# Patient Record
Sex: FEMALE
[De-identification: names, ages, dates, MRNs, and addresses within clinical notes are randomized per-mention and may not be internally consistent; named-entity substitution may affect disease eponyms.]

---

## 2018-04-09 ENCOUNTER — HOSPITAL ENCOUNTER (OUTPATIENT)
Dept: HOSPITAL 90 - RAH | Age: 68
Discharge: HOME | End: 2018-04-09
Attending: FAMILY MEDICINE
Payer: COMMERCIAL

## 2018-04-09 DIAGNOSIS — M25.462: Primary | ICD-10-CM

## 2018-04-09 PROCEDURE — 73721 MRI JNT OF LWR EXTRE W/O DYE: CPT

## 2019-06-26 ENCOUNTER — HOSPITAL ENCOUNTER (OUTPATIENT)
Dept: HOSPITAL 95 - LAB EV | Age: 69
Discharge: HOME | End: 2019-06-26
Attending: PHYSICIAN ASSISTANT
Payer: MEDICARE

## 2019-06-26 DIAGNOSIS — E11.37X1: Primary | ICD-10-CM

## 2019-06-26 LAB
ALBUMIN SERPL BCP-MCNC: 3.7 G/DL (ref 3.4–5)
ALBUMIN/GLOB SERPL: 0.9 {RATIO} (ref 0.8–1.8)
ALT SERPL W P-5'-P-CCNC: 29 U/L (ref 12–78)
ANION GAP SERPL CALCULATED.4IONS-SCNC: 10 MMOL/L (ref 6–16)
AST SERPL W P-5'-P-CCNC: 16 U/L (ref 12–37)
BASOPHILS # BLD AUTO: 0.07 K/MM3 (ref 0–0.23)
BASOPHILS NFR BLD AUTO: 1 % (ref 0–2)
BILIRUB SERPL-MCNC: 0.2 MG/DL (ref 0.1–1)
BUN SERPL-MCNC: 29 MG/DL (ref 8–24)
CALCIUM SERPL-MCNC: 9.9 MG/DL (ref 8.5–10.1)
CHLORIDE SERPL-SCNC: 101 MMOL/L (ref 98–108)
CO2 SERPL-SCNC: 27 MMOL/L (ref 21–32)
CREAT SERPL-MCNC: 0.96 MG/DL (ref 0.4–1)
DEPRECATED RDW RBC AUTO: 40.9 FL (ref 35.1–46.3)
EOSINOPHIL # BLD AUTO: 0.15 K/MM3 (ref 0–0.68)
EOSINOPHIL NFR BLD AUTO: 2 % (ref 0–6)
ERYTHROCYTE [DISTWIDTH] IN BLOOD BY AUTOMATED COUNT: 13.1 % (ref 11.7–14.2)
GLOBULIN SER CALC-MCNC: 3.9 G/DL (ref 2.2–4)
GLUCOSE SERPL-MCNC: 195 MG/DL (ref 70–99)
HCT VFR BLD AUTO: 37 % (ref 33–51)
HGB BLD-MCNC: 12.8 G/DL (ref 11.5–16)
IMM GRANULOCYTES # BLD AUTO: 0.02 K/MM3 (ref 0–0.1)
IMM GRANULOCYTES NFR BLD AUTO: 0 % (ref 0–1)
LYMPHOCYTES # BLD AUTO: 3.49 K/MM3 (ref 0.84–5.2)
LYMPHOCYTES NFR BLD AUTO: 35 % (ref 21–46)
MCHC RBC AUTO-ENTMCNC: 34.6 G/DL (ref 31.5–36.5)
MCV RBC AUTO: 87 FL (ref 80–100)
MONOCYTES # BLD AUTO: 0.58 K/MM3 (ref 0.16–1.47)
MONOCYTES NFR BLD AUTO: 6 % (ref 4–13)
NEUTROPHILS # BLD AUTO: 5.61 K/MM3 (ref 1.96–9.15)
NEUTROPHILS NFR BLD AUTO: 57 % (ref 41–73)
NRBC # BLD AUTO: 0 K/MM3 (ref 0–0.02)
NRBC BLD AUTO-RTO: 0 /100 WBC (ref 0–0.2)
PLATELET # BLD AUTO: 252 K/MM3 (ref 150–400)
POTASSIUM SERPL-SCNC: 4.2 MMOL/L (ref 3.5–5.5)
PROT SERPL-MCNC: 7.6 G/DL (ref 6.4–8.2)
SODIUM SERPL-SCNC: 138 MMOL/L (ref 136–145)

## 2022-04-22 ENCOUNTER — HOSPITAL ENCOUNTER (OUTPATIENT)
Dept: HOSPITAL 95 - LAB | Age: 72
Discharge: HOME | End: 2022-04-22
Attending: PHYSICIAN ASSISTANT
Payer: MEDICARE

## 2022-04-22 DIAGNOSIS — N39.0: Primary | ICD-10-CM

## 2022-05-08 ENCOUNTER — HOSPITAL ENCOUNTER (INPATIENT)
Dept: HOSPITAL 95 - ER | Age: 72
LOS: 2 days | Discharge: HOME | DRG: 690 | End: 2022-05-10
Attending: HOSPITALIST | Admitting: HOSPITALIST
Payer: MEDICARE

## 2022-05-08 VITALS — BODY MASS INDEX: 34.36 KG/M2 | HEIGHT: 60 IN | WEIGHT: 175 LBS

## 2022-05-08 DIAGNOSIS — E11.65: ICD-10-CM

## 2022-05-08 DIAGNOSIS — I10: ICD-10-CM

## 2022-05-08 DIAGNOSIS — E86.9: ICD-10-CM

## 2022-05-08 DIAGNOSIS — E87.1: ICD-10-CM

## 2022-05-08 DIAGNOSIS — B96.89: ICD-10-CM

## 2022-05-08 DIAGNOSIS — N39.0: Primary | ICD-10-CM

## 2022-05-08 DIAGNOSIS — E86.1: ICD-10-CM

## 2022-05-08 DIAGNOSIS — R11.2: ICD-10-CM

## 2022-05-08 DIAGNOSIS — E86.0: ICD-10-CM

## 2022-05-08 LAB
ALBUMIN SERPL BCP-MCNC: 3.6 G/DL (ref 3.4–5)
ALBUMIN/GLOB SERPL: 1.2 {RATIO} (ref 0.8–1.8)
ALT SERPL W P-5'-P-CCNC: 23 U/L (ref 12–78)
ANION GAP SERPL CALCULATED.4IONS-SCNC: 7 MMOL/L (ref 6–16)
AST SERPL W P-5'-P-CCNC: 17 U/L (ref 12–37)
BASOPHILS # BLD AUTO: 0.06 K/MM3 (ref 0–0.23)
BASOPHILS NFR BLD AUTO: 1 % (ref 0–2)
BILIRUB SERPL-MCNC: 0.5 MG/DL (ref 0.1–1)
BUN SERPL-MCNC: 18 MG/DL (ref 8–24)
CALCIUM SERPL-MCNC: 8.8 MG/DL (ref 8.5–10.1)
CHLORIDE SERPL-SCNC: 85 MMOL/L (ref 98–108)
CO2 SERPL-SCNC: 27 MMOL/L (ref 21–32)
CREAT SERPL-MCNC: 0.88 MG/DL (ref 0.4–1)
DEPRECATED RDW RBC AUTO: 37.8 FL (ref 35.1–46.3)
EOSINOPHIL # BLD AUTO: 0.03 K/MM3 (ref 0–0.68)
EOSINOPHIL NFR BLD AUTO: 1 % (ref 0–6)
ERYTHROCYTE [DISTWIDTH] IN BLOOD BY AUTOMATED COUNT: 12.4 % (ref 11.7–14.2)
FLUAV RNA SPEC QL NAA+PROBE: NEGATIVE
FLUBV RNA SPEC QL NAA+PROBE: NEGATIVE
GLOBULIN SER CALC-MCNC: 3.1 G/DL (ref 2.2–4)
GLUCOSE SERPL-MCNC: 196 MG/DL (ref 70–99)
HCT VFR BLD AUTO: 34.2 % (ref 33–51)
HGB BLD-MCNC: 12.2 G/DL (ref 11.5–16)
IMM GRANULOCYTES # BLD AUTO: 0.01 K/MM3 (ref 0–0.1)
IMM GRANULOCYTES NFR BLD AUTO: 0 % (ref 0–1)
LEUKOCYTE ESTERASE UR QL STRIP: (no result)
LYMPHOCYTES # BLD AUTO: 1.5 K/MM3 (ref 0.84–5.2)
LYMPHOCYTES NFR BLD AUTO: 24 % (ref 21–46)
MCHC RBC AUTO-ENTMCNC: 35.7 G/DL (ref 31.5–36.5)
MCV RBC AUTO: 83 FL (ref 80–100)
MONOCYTES # BLD AUTO: 0.48 K/MM3 (ref 0.16–1.47)
MONOCYTES NFR BLD AUTO: 8 % (ref 4–13)
NEUTROPHILS # BLD AUTO: 4.27 K/MM3 (ref 1.96–9.15)
NEUTROPHILS NFR BLD AUTO: 67 % (ref 41–73)
NRBC # BLD AUTO: 0 K/MM3 (ref 0–0.02)
NRBC BLD AUTO-RTO: 0 /100 WBC (ref 0–0.2)
PLATELET # BLD AUTO: 291 K/MM3 (ref 150–400)
POTASSIUM SERPL-SCNC: 4.3 MMOL/L (ref 3.5–5.5)
PROT SERPL-MCNC: 6.7 G/DL (ref 6.4–8.2)
RBC #/AREA URNS HPF: (no result) /HPF (ref 0–2)
RSV RNA SPEC QL NAA+PROBE: NEGATIVE
SARS-COV-2 RNA RESP QL NAA+PROBE: NEGATIVE
SODIUM SERPL-SCNC: 119 MMOL/L (ref 136–145)
SP GR SPEC: 1.01 (ref 1–1.02)
UROBILINOGEN UR STRIP-MCNC: (no result) MG/DL
WBC #/AREA URNS HPF: (no result) /HPF (ref 0–5)

## 2022-05-08 PROCEDURE — A9270 NON-COVERED ITEM OR SERVICE: HCPCS

## 2022-05-08 NOTE — NUR
SHIFT SUMMARY
PT TO ROOM AT 1241. PT ABLE TO AMBULATE W/ CANE & STANDBY ASSIST. PT IS UNABLE
TO COMMUNICATE IN ENGLISH. PT'S ADULT SON OR DAUGHTER IN LAW ARE ABLE TO
TRANSLATE FOR PT WHEN PRESENT. PT ATE DINNER W/O COMPLICATIONS. NS INFUSION @
150ML/HR STARTED. PT RESTING IN ROOM AT THIS TIME.

## 2022-05-08 NOTE — NUR
PT TO ROOM 1241
PT TO ROOM FROM ER W/ ADULT DAUGHTER PRESENT. PT TRANSFERRED TO BED W/ STANDBY
ASSISTANCE FROM STAFF. PT IS UNABLE TO COMMUNICATE IN ENGLISH. PT'S DAUGHTER
HAS STATED THAT EITHER SHE OR HER  ARE ABLE TO TRANSLATE ON THE PT'S
BEHALF WHILE THEY ARE PRESENT. DAUGHTER STATED THAT SHE DID NEED TO LEAVE FOR
A SHORT PERIOD OF TIME BUT LEFT HER PHONE NUMBER W/ STAFF. PT'S ADMISSION
ASSESSMENT COMPLETED. PT RESTING IN ROOM.

## 2022-05-08 NOTE — NUR
HOME MEDS
UPDATED GLUCOPHAGE HOME DOSE PER PATIENTS SON. SON STATES THAT ORIGINALLY IT
WAS PRESCRIBED AS 1000MG BID BUT THAT WAS MAKING HER CBG CLOSE TO THE LOW END
OF NORMAL SO THE DOSE WAS ADJUSTED  MG BID WHICH HAS BEEN KEEPING HER
GLUCOSE BETWEEN 100-106 PRIOR TO ADMISSION.

## 2022-05-09 LAB
ANION GAP SERPL CALCULATED.4IONS-SCNC: 7 MMOL/L (ref 6–16)
BUN SERPL-MCNC: 14 MG/DL (ref 8–24)
CALCIUM SERPL-MCNC: 8.7 MG/DL (ref 8.5–10.1)
CHLORIDE SERPL-SCNC: 92 MMOL/L (ref 98–108)
CO2 SERPL-SCNC: 26 MMOL/L (ref 21–32)
CREAT SERPL-MCNC: 0.81 MG/DL (ref 0.4–1)
GLUCOSE SERPL-MCNC: 115 MG/DL (ref 70–99)
POTASSIUM SERPL-SCNC: 4 MMOL/L (ref 3.5–5.5)
SODIUM SERPL-SCNC: 125 MMOL/L (ref 136–145)

## 2022-05-09 NOTE — NUR
family in to see pt. no acute changes, iv was replaced, no furhter changes
this shift. call light in reach.

## 2022-05-09 NOTE — NUR
pt sitting up on side of bed eating breakfast, a/ox3, pleasant, she is North Korean
speaking so there is a launguage barrier, she is cooperative with care,
follows commands well, lungs are clear t/o, resp even and unlabored, no cough
noted, hrr, tele in place running sr with 1st degree block and pacs, no edema
noted, ppp+2, cap refill<3sec, vs stable, afebrile, iv site to rac is slightly
leaking will monitor, infusing ns at 150mls/hr, btx4, abd flat soft nontender,
voids without diff, has some incont last night, skin c/w/d, maew, holly, call
light in reach.

## 2022-05-09 NOTE — NUR
SHIFT SUMMARY: PATIENT A&OX4, DENIES PAIN, N/V. TELE = NSR WITH [facility] PAC. IVF
@150 CORRECTING SODIUM. PATIENT AMBULATED TO BATHROOM USE OF FWW MILD GAIT,
SBA FOR SAFETY. PRESISTANT COUGH THOUGHT TO BE D/T ALLERGIES PER PCP. PATIENT
REQUESTING FLONASE BID. ALSO WANTED SOMETHING TO HELP HER SLEEP. MD CONTACTED
AND ORDERS OBTAINED. PATIENT SLEPT WELL THROUGH THE NIGHT.

## 2022-05-10 LAB
ALBUMIN SERPL BCP-MCNC: 3.4 G/DL (ref 3.4–5)
ALBUMIN/GLOB SERPL: 1.3 {RATIO} (ref 0.8–1.8)
ALT SERPL W P-5'-P-CCNC: 24 U/L (ref 12–78)
ANION GAP SERPL CALCULATED.4IONS-SCNC: 7 MMOL/L (ref 6–16)
AST SERPL W P-5'-P-CCNC: 16 U/L (ref 12–37)
BILIRUB SERPL-MCNC: 0.2 MG/DL (ref 0.1–1)
BUN SERPL-MCNC: 16 MG/DL (ref 8–24)
CALCIUM SERPL-MCNC: 9 MG/DL (ref 8.5–10.1)
CHLORIDE SERPL-SCNC: 94 MMOL/L (ref 98–108)
CO2 SERPL-SCNC: 26 MMOL/L (ref 21–32)
CREAT SERPL-MCNC: 0.91 MG/DL (ref 0.4–1)
DEPRECATED RDW RBC AUTO: 40.1 FL (ref 35.1–46.3)
ERYTHROCYTE [DISTWIDTH] IN BLOOD BY AUTOMATED COUNT: 13 % (ref 11.7–14.2)
GLOBULIN SER CALC-MCNC: 2.7 G/DL (ref 2.2–4)
GLUCOSE SERPL-MCNC: 107 MG/DL (ref 70–99)
HCT VFR BLD AUTO: 32.2 % (ref 33–51)
HGB BLD-MCNC: 11.4 G/DL (ref 11.5–16)
MAGNESIUM SERPL-MCNC: 2 MG/DL (ref 1.6–2.4)
MCHC RBC AUTO-ENTMCNC: 35.4 G/DL (ref 31.5–36.5)
MCV RBC AUTO: 85 FL (ref 80–100)
NRBC # BLD AUTO: 0 K/MM3 (ref 0–0.02)
NRBC BLD AUTO-RTO: 0 /100 WBC (ref 0–0.2)
PLATELET # BLD AUTO: 284 K/MM3 (ref 150–400)
POTASSIUM SERPL-SCNC: 4.7 MMOL/L (ref 3.5–5.5)
PROT SERPL-MCNC: 6.1 G/DL (ref 6.4–8.2)
SODIUM SERPL-SCNC: 127 MMOL/L (ref 136–145)
TSH SERPL DL<=0.005 MIU/L-ACNC: 1.11 UIU/ML (ref 0.36–4.8)

## 2022-05-10 NOTE — NUR
AM Note
Pt alert, oriented x4; calm and cooperative with care. Pt sitting up on side
of bed. Assessment was completed with  phone. Pt denies pain, chest
pain, sob, nausea, dizziness/lightheadedness and numb/tingling. Pt is not on
tele, bp elevated but stable, hr stable. Ls clear t/o, resp rate wnl,
breathing even and unlabored. Pt abd, soft nontender, with normoactive bt t/o.
Vss. No s/sx of distress noted. Will continue to monitor.

## 2022-05-10 NOTE — NUR
No acute changes t/o shift. Educated pt and son on discharge instructions,
follow up appointment and medications. Walker delivered by [facility] prior to
discharge. prescriptions faxed to [facility] pharmacy per pt request. Pt left
room with walker and son.

## 2022-05-10 NOTE — NUR
SHIFT SUMMARY: NO SIGNIFCANT EVENTS ON NOC. SLEPT WELL THROUGH THE NIGHT.
PATIENT IS EAGER TO DC HOME TODAY.

## 2022-05-13 ENCOUNTER — HOSPITAL ENCOUNTER (INPATIENT)
Dept: HOSPITAL 95 - ER | Age: 72
LOS: 5 days | Discharge: HOME HEALTH | DRG: 641 | End: 2022-05-18
Attending: HOSPITALIST | Admitting: HOSPITALIST
Payer: MEDICARE

## 2022-05-13 VITALS — WEIGHT: 172.18 LBS | BODY MASS INDEX: 30.51 KG/M2 | HEIGHT: 63 IN

## 2022-05-13 DIAGNOSIS — R05.3: ICD-10-CM

## 2022-05-13 DIAGNOSIS — S09.90XA: ICD-10-CM

## 2022-05-13 DIAGNOSIS — Z79.84: ICD-10-CM

## 2022-05-13 DIAGNOSIS — R33.9: ICD-10-CM

## 2022-05-13 DIAGNOSIS — I10: ICD-10-CM

## 2022-05-13 DIAGNOSIS — Z79.899: ICD-10-CM

## 2022-05-13 DIAGNOSIS — N17.9: ICD-10-CM

## 2022-05-13 DIAGNOSIS — N39.0: ICD-10-CM

## 2022-05-13 DIAGNOSIS — F32.A: ICD-10-CM
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
## 2022-05-13 DIAGNOSIS — R11.2: ICD-10-CM

## 2022-05-13 DIAGNOSIS — E87.1: Primary | ICD-10-CM

## 2022-05-13 DIAGNOSIS — E11.9: ICD-10-CM

## 2022-05-13 LAB
ALBUMIN SERPL BCP-MCNC: 3.9 G/DL (ref 3.4–5)
ALBUMIN/GLOB SERPL: 1.3 {RATIO} (ref 0.8–1.8)
ALT SERPL W P-5'-P-CCNC: 27 U/L (ref 12–78)
ANION GAP SERPL CALCULATED.4IONS-SCNC: 12 MMOL/L (ref 6–16)
ANION GAP SERPL CALCULATED.4IONS-SCNC: 7 MMOL/L (ref 6–16)
AST SERPL W P-5'-P-CCNC: 22 U/L (ref 12–37)
BASOPHILS # BLD AUTO: 0.05 K/MM3 (ref 0–0.23)
BASOPHILS NFR BLD AUTO: 1 % (ref 0–2)
BILIRUB SERPL-MCNC: 0.3 MG/DL (ref 0.1–1)
BUN SERPL-MCNC: 15 MG/DL (ref 8–24)
BUN SERPL-MCNC: 19 MG/DL (ref 8–24)
CALCIUM SERPL-MCNC: 9.3 MG/DL (ref 8.5–10.1)
CALCIUM SERPL-MCNC: 9.4 MG/DL (ref 8.5–10.1)
CHLORIDE SERPL-SCNC: 82 MMOL/L (ref 98–108)
CHLORIDE SERPL-SCNC: 84 MMOL/L (ref 98–108)
CO2 SERPL-SCNC: 24 MMOL/L (ref 21–32)
CO2 SERPL-SCNC: 27 MMOL/L (ref 21–32)
CREAT SERPL-MCNC: 1.01 MG/DL (ref 0.4–1)
CREAT SERPL-MCNC: 1.1 MG/DL (ref 0.4–1)
DEPRECATED RDW RBC AUTO: 38.5 FL (ref 35.1–46.3)
EOSINOPHIL # BLD AUTO: 0.07 K/MM3 (ref 0–0.68)
EOSINOPHIL NFR BLD AUTO: 1 % (ref 0–6)
ERYTHROCYTE [DISTWIDTH] IN BLOOD BY AUTOMATED COUNT: 12.7 % (ref 11.7–14.2)
GLOBULIN SER CALC-MCNC: 2.9 G/DL (ref 2.2–4)
GLUCOSE SERPL-MCNC: 117 MG/DL (ref 70–99)
GLUCOSE SERPL-MCNC: 209 MG/DL (ref 70–99)
HCT VFR BLD AUTO: 35.6 % (ref 33–51)
HGB BLD-MCNC: 12.9 G/DL (ref 11.5–16)
IMM GRANULOCYTES # BLD AUTO: 0.03 K/MM3 (ref 0–0.1)
IMM GRANULOCYTES NFR BLD AUTO: 0 % (ref 0–1)
LYMPHOCYTES # BLD AUTO: 2.78 K/MM3 (ref 0.84–5.2)
LYMPHOCYTES NFR BLD AUTO: 36 % (ref 21–46)
MCHC RBC AUTO-ENTMCNC: 36.2 G/DL (ref 31.5–36.5)
MCV RBC AUTO: 83 FL (ref 80–100)
MONOCYTES # BLD AUTO: 0.59 K/MM3 (ref 0.16–1.47)
MONOCYTES NFR BLD AUTO: 8 % (ref 4–13)
NEUTROPHILS # BLD AUTO: 4.24 K/MM3 (ref 1.96–9.15)
NEUTROPHILS NFR BLD AUTO: 55 % (ref 41–73)
NRBC # BLD AUTO: 0 K/MM3 (ref 0–0.02)
NRBC BLD AUTO-RTO: 0 /100 WBC (ref 0–0.2)
PLATELET # BLD AUTO: 323 K/MM3 (ref 150–400)
POTASSIUM SERPL-SCNC: 4.6 MMOL/L (ref 3.5–5.5)
POTASSIUM SERPL-SCNC: 4.9 MMOL/L (ref 3.5–5.5)
PROT SERPL-MCNC: 6.8 G/DL (ref 6.4–8.2)
SODIUM SERPL-SCNC: 118 MMOL/L (ref 136–145)
SODIUM SERPL-SCNC: 118 MMOL/L (ref 136–145)
SP GR SPEC: 1.01 (ref 1–1.02)
UROBILINOGEN UR STRIP-MCNC: (no result) MG/DL

## 2022-05-13 PROCEDURE — G0378 HOSPITAL OBSERVATION PER HR: HCPCS

## 2022-05-13 PROCEDURE — A9270 NON-COVERED ITEM OR SERVICE: HCPCS

## 2022-05-13 NOTE — NUR
SHIFT SUMMARY
PATIENT ADMITTED FROM ER FOR HYPONATREMIA. PATIENT DISCHARGED FROM HOSPITAL 3
DAYS AGO FOR SAME ISSUE. PATIENT EXPERIENCING WEAKNESS, DIZZINESS AND FELL AT
HOME AND HIT HEAD ON TOILET. STATED THAT SHE HAS HAD A POOR APPETITE,
EXPERIENCING NAUSEA AND VOMITING, DENIES ANY ABDOMINAL PAIN OR TENDERNESS .
PATIENT A&0 X4. Gabonese SPEAKING AND DAUGHTER IN LAW AT BEDSIDE TO TRANSLATE.
PATIENT HAS PRODUCTIVE COUGH OF CLEAR THIN SPUTUM.  INCONT OF URINE. BASS
CATHETER PLACED. RECEIVING IV FLUIDS. ON CLEAR LIQUID DIET, TOLERATING WELL.
VSS. WILL CONTINUE TO MONITOR.

## 2022-05-14 LAB
ALBUMIN SERPL BCP-MCNC: 3.5 G/DL (ref 3.4–5)
ALBUMIN/GLOB SERPL: 1.2 {RATIO} (ref 0.8–1.8)
ALT SERPL W P-5'-P-CCNC: 23 U/L (ref 12–78)
ANION GAP SERPL CALCULATED.4IONS-SCNC: 8 MMOL/L (ref 6–16)
AST SERPL W P-5'-P-CCNC: 18 U/L (ref 12–37)
BASOPHILS # BLD AUTO: 0.07 K/MM3 (ref 0–0.23)
BASOPHILS NFR BLD AUTO: 1 % (ref 0–2)
BILIRUB SERPL-MCNC: 0.4 MG/DL (ref 0.1–1)
BUN SERPL-MCNC: 13 MG/DL (ref 8–24)
CALCIUM SERPL-MCNC: 9 MG/DL (ref 8.5–10.1)
CHLORIDE SERPL-SCNC: 90 MMOL/L (ref 98–108)
CO2 SERPL-SCNC: 25 MMOL/L (ref 21–32)
CREAT SERPL-MCNC: 0.96 MG/DL (ref 0.4–1)
DEPRECATED RDW RBC AUTO: 40.3 FL (ref 35.1–46.3)
EOSINOPHIL # BLD AUTO: 0.12 K/MM3 (ref 0–0.68)
EOSINOPHIL NFR BLD AUTO: 2 % (ref 0–6)
ERYTHROCYTE [DISTWIDTH] IN BLOOD BY AUTOMATED COUNT: 13.1 % (ref 11.7–14.2)
GLOBULIN SER CALC-MCNC: 2.8 G/DL (ref 2.2–4)
GLUCOSE SERPL-MCNC: 104 MG/DL (ref 70–99)
HCT VFR BLD AUTO: 34.3 % (ref 33–51)
HGB BLD-MCNC: 12.3 G/DL (ref 11.5–16)
IMM GRANULOCYTES # BLD AUTO: 0.02 K/MM3 (ref 0–0.1)
IMM GRANULOCYTES NFR BLD AUTO: 0 % (ref 0–1)
LYMPHOCYTES # BLD AUTO: 2.36 K/MM3 (ref 0.84–5.2)
LYMPHOCYTES NFR BLD AUTO: 41 % (ref 21–46)
MCHC RBC AUTO-ENTMCNC: 35.9 G/DL (ref 31.5–36.5)
MCV RBC AUTO: 84 FL (ref 80–100)
MONOCYTES # BLD AUTO: 0.68 K/MM3 (ref 0.16–1.47)
MONOCYTES NFR BLD AUTO: 12 % (ref 4–13)
NEUTROPHILS # BLD AUTO: 2.45 K/MM3 (ref 1.96–9.15)
NEUTROPHILS NFR BLD AUTO: 43 % (ref 41–73)
NRBC # BLD AUTO: 0 K/MM3 (ref 0–0.02)
NRBC BLD AUTO-RTO: 0 /100 WBC (ref 0–0.2)
PLATELET # BLD AUTO: 300 K/MM3 (ref 150–400)
POTASSIUM SERPL-SCNC: 4.7 MMOL/L (ref 3.5–5.5)
PROT SERPL-MCNC: 6.3 G/DL (ref 6.4–8.2)
SODIUM SERPL-SCNC: 123 MMOL/L (ref 136–145)

## 2022-05-14 NOTE — NUR
FAMILY INTERPRETING FOR PATIENT, NOT PRESENT AFTER 2200.  FAMILY REQUESTING
MEDICATION TO HELP PATIENT SLEEP AND INQUIRING ABOUT ANTIBIOTICS PATIENT IS
TAKING AT HOME.  SON STATES PATIENT IS ON BACTRIM AT TWICE DAILY AND DID NOT
RECEIVE HER MORNING OR NIGHT DOSE ON 5/13
FAMILY UNABLE TO GIVE DOSAGE.  PT SLEPT THROUGH NIGHT.

## 2022-05-14 NOTE — NUR
SHIFT SUMMARY
PATIENT A&0X4. Mongolian SPEAKING ONLY. PLEASANT AND COOPERATIVE WITH CARE.
ADVANCED TO ADA DIET, TOLERATING WELL. NO COMPLAINTS OF N/V. IS COMPLAINING OF
COUGH, MEDICATED PER MAR. BASS CATHETER IN PLACE DRAINING TO GRAVITY. VSS.
WILL CONTINUE TO MONITOR.

## 2022-05-15 LAB
ANION GAP SERPL CALCULATED.4IONS-SCNC: 10 MMOL/L (ref 6–16)
ANION GAP SERPL CALCULATED.4IONS-SCNC: 6 MMOL/L (ref 6–16)
BUN SERPL-MCNC: 16 MG/DL (ref 8–24)
BUN SERPL-MCNC: 19 MG/DL (ref 8–24)
CALCIUM SERPL-MCNC: 9.3 MG/DL (ref 8.5–10.1)
CALCIUM SERPL-MCNC: 9.4 MG/DL (ref 8.5–10.1)
CHLORIDE SERPL-SCNC: 89 MMOL/L (ref 98–108)
CHLORIDE SERPL-SCNC: 90 MMOL/L (ref 98–108)
CO2 SERPL-SCNC: 25 MMOL/L (ref 21–32)
CO2 SERPL-SCNC: 27 MMOL/L (ref 21–32)
CREAT SERPL-MCNC: 0.93 MG/DL (ref 0.4–1)
CREAT SERPL-MCNC: 0.98 MG/DL (ref 0.4–1)
GLUCOSE SERPL-MCNC: 120 MG/DL (ref 70–99)
GLUCOSE SERPL-MCNC: 129 MG/DL (ref 70–99)
POTASSIUM SERPL-SCNC: 4.1 MMOL/L (ref 3.5–5.5)
POTASSIUM SERPL-SCNC: 4.4 MMOL/L (ref 3.5–5.5)
SODIUM SERPL-SCNC: 122 MMOL/L (ref 136–145)
SODIUM SERPL-SCNC: 125 MMOL/L (ref 136–145)

## 2022-05-15 NOTE — NUR
SHIFT SUMMARY: PATIENT HAS NO COMPLAINTS THIS SHIFT, VSS. SODIUM LEVEL WAS
118 UPON ARRIVAL TO ER, 123 S/P IVF. NO LABS WERE ORDERED FOR MONIOTRING. DR CRANDALL WAS NOTIFIED AND ORDER FOR BMP WAS OBTAINED. SODIUM 125 @ 2330. DR WHYTE
WAS NOTIFIED. NO NEW ORDERS GIVEN, THIS WAS A SATIFACTORY INCREASE IN SODIUM
FOR A 24 HOUR PERIOD PER DR WHYTE. BASS IS DRAINING CLEAR YELLOW URINE.
PATIENT IS ABLE TO MAKE HER NEEDS KNOWN.

## 2022-05-15 NOTE — NUR
PATIENT SODIUM LEVEL CONTINUED TO SLIGHTLY DECREASE TODAY. IT IS 
CURRENTLY. ANOTHER 2 BAGS OF NACL WERE ORDERED AND THE FIRST IS INFUSING AT
THIS TIME. BLOOD SUGARS HAVE BEEN NORMAL, OR VERY CLOSE TO WNL. SON HAS BEEN
HERE FOR MOST OF THE SHIFT. PATIENT DENIES PAIN OR ANY OTHER CONCERNS BESIDES
SODIUM. ALERT AND PLEASANT. POSSIBLY HOME TOMORROW.

## 2022-05-16 LAB
ALBUMIN SERPL BCP-MCNC: 3.2 G/DL (ref 3.4–5)
ALBUMIN/GLOB SERPL: 1.2 {RATIO} (ref 0.8–1.8)
ALT SERPL W P-5'-P-CCNC: 21 U/L (ref 12–78)
ANION GAP SERPL CALCULATED.4IONS-SCNC: 7 MMOL/L (ref 6–16)
ANION GAP SERPL CALCULATED.4IONS-SCNC: 8 MMOL/L (ref 6–16)
AST SERPL W P-5'-P-CCNC: 16 U/L (ref 12–37)
BILIRUB SERPL-MCNC: 0.3 MG/DL (ref 0.1–1)
BUN SERPL-MCNC: 17 MG/DL (ref 8–24)
BUN SERPL-MCNC: 19 MG/DL (ref 8–24)
CALCIUM SERPL-MCNC: 8.7 MG/DL (ref 8.5–10.1)
CALCIUM SERPL-MCNC: 8.9 MG/DL (ref 8.5–10.1)
CHLORIDE SERPL-SCNC: 89 MMOL/L (ref 98–108)
CHLORIDE SERPL-SCNC: 92 MMOL/L (ref 98–108)
CO2 SERPL-SCNC: 25 MMOL/L (ref 21–32)
CO2 SERPL-SCNC: 27 MMOL/L (ref 21–32)
CREAT SERPL-MCNC: 0.8 MG/DL (ref 0.4–1)
CREAT SERPL-MCNC: 1 MG/DL (ref 0.4–1)
GLOBULIN SER CALC-MCNC: 2.7 G/DL (ref 2.2–4)
GLUCOSE SERPL-MCNC: 109 MG/DL (ref 70–99)
GLUCOSE SERPL-MCNC: 121 MG/DL (ref 70–99)
POTASSIUM SERPL-SCNC: 4.1 MMOL/L (ref 3.5–5.5)
POTASSIUM SERPL-SCNC: 4.4 MMOL/L (ref 3.5–5.5)
PROT SERPL-MCNC: 5.9 G/DL (ref 6.4–8.2)
SODIUM SERPL-SCNC: 123 MMOL/L (ref 136–145)
SODIUM SERPL-SCNC: 125 MMOL/L (ref 136–145)

## 2022-05-16 NOTE — NUR
PATIENT APPEARS TO BE IN NO DISTRESS. SHE HAS NO MEDICAL COMPLAINTS. SHE
WORKED WITH OT TODAY AND WAS STEADY AND ABLE TO DO TREATMENT. SODUIM LEVEL WAS
122 AT START OF SHIFT, SO PATIENT  WILL STAY TO HAVE MORE NACL DRIPPED. LAST
NA LAB . PROVIDER WILL BE BACK TOMORROW TO SEE HOW SHE IS DOING AND IF
LAB RESULTS ARE IMPROVING.

## 2022-05-16 NOTE — NUR
SHIFT SUMMARY: PATIENT IS A&O X3, REQUESTED TESSELON PEARLS FOR A PERSISTANT
NONPRO COUGH. MED WAS GIVEN WITH GOOD EFFECT. VSS, NS IS INFUSING AT 100ML/HR
PER MD ORDER. BASS IS DRAINING A VERY LIGHT YELLOW URINE. NO ACUTE CHANGES
THIS SHIFT.

## 2022-05-17 LAB
ANION GAP SERPL CALCULATED.4IONS-SCNC: 6 MMOL/L (ref 6–16)
ANION GAP SERPL CALCULATED.4IONS-SCNC: 7 MMOL/L (ref 6–16)
BUN SERPL-MCNC: 15 MG/DL (ref 8–24)
BUN SERPL-MCNC: 18 MG/DL (ref 8–24)
CALCIUM SERPL-MCNC: 8.6 MG/DL (ref 8.5–10.1)
CALCIUM SERPL-MCNC: 8.6 MG/DL (ref 8.5–10.1)
CHLORIDE SERPL-SCNC: 95 MMOL/L (ref 98–108)
CHLORIDE SERPL-SCNC: 96 MMOL/L (ref 98–108)
CO2 SERPL-SCNC: 25 MMOL/L (ref 21–32)
CO2 SERPL-SCNC: 27 MMOL/L (ref 21–32)
CREAT SERPL-MCNC: 0.83 MG/DL (ref 0.4–1)
CREAT SERPL-MCNC: 0.85 MG/DL (ref 0.4–1)
GLUCOSE SERPL-MCNC: 124 MG/DL (ref 70–99)
GLUCOSE SERPL-MCNC: 176 MG/DL (ref 70–99)
POTASSIUM SERPL-SCNC: 4.2 MMOL/L (ref 3.5–5.5)
POTASSIUM SERPL-SCNC: 4.3 MMOL/L (ref 3.5–5.5)
SODIUM SERPL-SCNC: 128 MMOL/L (ref 136–145)
SODIUM SERPL-SCNC: 128 MMOL/L (ref 136–145)

## 2022-05-17 NOTE — NUR
SHIFT SUMMARY NOC: PT SLEPT MOST OF SHIFT. PT SON TRANSLATES, PT Vietnamese
SPEAKING ONLY. PT URINE OUTPUT VERY GOOD. BASS STILL IN PLACE. NO ADVERSE
EVENTS.

## 2022-05-17 NOTE — NUR
SHIFT SUMMARY: RN STUDENT
PT ABLE TO COMMUNICATE WITH STUDENT RN WITH HAND GESTURES, HER CBG HAS BEEN
UNDER 150 ALL SHIFT AND INSULIN HAS BEEN HELD. NO COMPLAINTS OF DISCOMFORT OR
PAIN AT THIS TIME. PT IS TO DISCHARGE TOMORROW.

## 2022-05-17 NOTE — NUR
SHIFT SUMMARY
PT AA0X4, PT DENIES NEEDS DURING SHIFT AND CALLS APPROPRIATLY TO MAKE NEEDS
MET. SODIUM REMAINS UNCHANGED DURING REPEAT THIS AFTERNOON. PLAN IS TO FINISH
LAST BAG OF IV FLUIDS AND RECHECK IN THE MORNING. IF STABLE POSSIBLY DISCHARGE
TOMORROW. PT AMBULATING WITH THERAPY AT THIS TIME. TOLERATING PO WELL.

## 2022-05-18 LAB
ANION GAP SERPL CALCULATED.4IONS-SCNC: 12 MMOL/L (ref 6–16)
ANION GAP SERPL CALCULATED.4IONS-SCNC: 7 MMOL/L (ref 6–16)
BUN SERPL-MCNC: 18 MG/DL (ref 8–24)
BUN SERPL-MCNC: 19 MG/DL (ref 8–24)
CALCIUM SERPL-MCNC: 9.4 MG/DL (ref 8.5–10.1)
CALCIUM SERPL-MCNC: 9.6 MG/DL (ref 8.5–10.1)
CHLORIDE SERPL-SCNC: 95 MMOL/L (ref 98–108)
CHLORIDE SERPL-SCNC: 97 MMOL/L (ref 98–108)
CO2 SERPL-SCNC: 23 MMOL/L (ref 21–32)
CO2 SERPL-SCNC: 28 MMOL/L (ref 21–32)
CREAT SERPL-MCNC: 0.8 MG/DL (ref 0.4–1)
CREAT SERPL-MCNC: 0.88 MG/DL (ref 0.4–1)
GLUCOSE SERPL-MCNC: 108 MG/DL (ref 70–99)
GLUCOSE SERPL-MCNC: 140 MG/DL (ref 70–99)
POTASSIUM SERPL-SCNC: 4 MMOL/L (ref 3.5–5.5)
POTASSIUM SERPL-SCNC: 4.7 MMOL/L (ref 3.5–5.5)
SODIUM SERPL-SCNC: 130 MMOL/L (ref 136–145)
SODIUM SERPL-SCNC: 132 MMOL/L (ref 136–145)

## 2022-05-18 NOTE — NUR
SHIFT SUMMARY-
PT A&O X3. PT WITH FAMILY AT BEDSIDE. PT CHAIRFAST THROUGHOUT MOST OF SHIFT.
PT DISCHARGED WITH ALL BELONIGNS IN HAND. PT WHEELCHAIR TRANSFERED TO SON FOR
HOME DISCHARGE. PAPERWORK IN HAND.

## 2022-05-27 ENCOUNTER — HOSPITAL ENCOUNTER (INPATIENT)
Dept: HOSPITAL 95 - ER | Age: 72
LOS: 3 days | Discharge: HOME | DRG: 640 | End: 2022-05-30
Attending: HOSPITALIST | Admitting: HOSPITALIST
Payer: MEDICARE

## 2022-05-27 VITALS — WEIGHT: 176.37 LBS | BODY MASS INDEX: 32.46 KG/M2 | HEIGHT: 62 IN

## 2022-05-27 DIAGNOSIS — F32.A: ICD-10-CM

## 2022-05-27 DIAGNOSIS — I12.9: ICD-10-CM

## 2022-05-27 DIAGNOSIS — N18.9: ICD-10-CM

## 2022-05-27 DIAGNOSIS — E11.22: ICD-10-CM

## 2022-05-27 DIAGNOSIS — E66.9: ICD-10-CM

## 2022-05-27 DIAGNOSIS — B96.5: ICD-10-CM

## 2022-05-27 DIAGNOSIS — N39.0: ICD-10-CM

## 2022-05-27 DIAGNOSIS — Z79.84: ICD-10-CM

## 2022-05-27 DIAGNOSIS — G93.41: ICD-10-CM

## 2022-05-27 DIAGNOSIS — Z79.899: ICD-10-CM

## 2022-05-27 DIAGNOSIS — K59.00: ICD-10-CM

## 2022-05-27 DIAGNOSIS — E86.0: ICD-10-CM

## 2022-05-27 DIAGNOSIS — E87.1: Primary | ICD-10-CM

## 2022-05-27 DIAGNOSIS — Z98.890: ICD-10-CM

## 2022-05-27 DIAGNOSIS — D72.829: ICD-10-CM

## 2022-05-27 LAB
ALBUMIN SERPL BCP-MCNC: 3.9 G/DL (ref 3.4–5)
ALBUMIN/GLOB SERPL: 1.3 {RATIO} (ref 0.8–1.8)
ALT SERPL W P-5'-P-CCNC: 21 U/L (ref 12–78)
ANION GAP SERPL CALCULATED.4IONS-SCNC: 10 MMOL/L (ref 6–16)
AST SERPL W P-5'-P-CCNC: 18 U/L (ref 12–37)
BASOPHILS # BLD AUTO: 0.07 K/MM3 (ref 0–0.23)
BASOPHILS NFR BLD AUTO: 1 % (ref 0–2)
BILIRUB SERPL-MCNC: 0.3 MG/DL (ref 0.1–1)
BUN SERPL-MCNC: 21 MG/DL (ref 8–24)
CALCIUM SERPL-MCNC: 9 MG/DL (ref 8.5–10.1)
CHLORIDE SERPL-SCNC: 86 MMOL/L (ref 98–108)
CO2 SERPL-SCNC: 27 MMOL/L (ref 21–32)
CREAT SERPL-MCNC: 0.84 MG/DL (ref 0.4–1)
DEPRECATED RDW RBC AUTO: 39.2 FL (ref 35.1–46.3)
EOSINOPHIL # BLD AUTO: 0.09 K/MM3 (ref 0–0.68)
EOSINOPHIL NFR BLD AUTO: 1 % (ref 0–6)
ERYTHROCYTE [DISTWIDTH] IN BLOOD BY AUTOMATED COUNT: 12.8 % (ref 11.7–14.2)
GLOBULIN SER CALC-MCNC: 3 G/DL (ref 2.2–4)
GLUCOSE SERPL-MCNC: 151 MG/DL (ref 70–99)
HCT VFR BLD AUTO: 32.2 % (ref 33–51)
HGB BLD-MCNC: 11.3 G/DL (ref 11.5–16)
IMM GRANULOCYTES # BLD AUTO: 0.04 K/MM3 (ref 0–0.1)
IMM GRANULOCYTES NFR BLD AUTO: 0 % (ref 0–1)
LEUKOCYTE ESTERASE UR QL STRIP: (no result)
LYMPHOCYTES # BLD AUTO: 2.81 K/MM3 (ref 0.84–5.2)
LYMPHOCYTES NFR BLD AUTO: 27 % (ref 21–46)
MCHC RBC AUTO-ENTMCNC: 35.1 G/DL (ref 31.5–36.5)
MCV RBC AUTO: 85 FL (ref 80–100)
MONOCYTES # BLD AUTO: 0.63 K/MM3 (ref 0.16–1.47)
MONOCYTES NFR BLD AUTO: 6 % (ref 4–13)
NEUTROPHILS # BLD AUTO: 6.69 K/MM3 (ref 1.96–9.15)
NEUTROPHILS NFR BLD AUTO: 65 % (ref 41–73)
NRBC # BLD AUTO: 0 K/MM3 (ref 0–0.02)
NRBC BLD AUTO-RTO: 0 /100 WBC (ref 0–0.2)
PLATELET # BLD AUTO: 285 K/MM3 (ref 150–400)
POTASSIUM SERPL-SCNC: 4 MMOL/L (ref 3.5–5.5)
PROT SERPL-MCNC: 6.9 G/DL (ref 6.4–8.2)
SODIUM SERPL-SCNC: 123 MMOL/L (ref 136–145)
SP GR SPEC: 1.01 (ref 1–1.02)
UROBILINOGEN UR STRIP-MCNC: (no result) MG/DL
WBC #/AREA URNS HPF: (no result) /HPF (ref 0–5)

## 2022-05-27 PROCEDURE — A9270 NON-COVERED ITEM OR SERVICE: HCPCS

## 2022-05-28 LAB
ALBUMIN SERPL BCP-MCNC: 3.4 G/DL (ref 3.4–5)
ALBUMIN SERPL BCP-MCNC: 3.6 G/DL (ref 3.4–5)
ALBUMIN SERPL BCP-MCNC: 3.8 G/DL (ref 3.4–5)
ALBUMIN SERPL BCP-MCNC: 3.9 G/DL (ref 3.4–5)
ALBUMIN/GLOB SERPL: 1.2 {RATIO} (ref 0.8–1.8)
ALBUMIN/GLOB SERPL: 1.3 {RATIO} (ref 0.8–1.8)
ALT SERPL W P-5'-P-CCNC: 18 U/L (ref 12–78)
ALT SERPL W P-5'-P-CCNC: 20 U/L (ref 12–78)
ALT SERPL W P-5'-P-CCNC: 21 U/L (ref 12–78)
ALT SERPL W P-5'-P-CCNC: 21 U/L (ref 12–78)
ANION GAP SERPL CALCULATED.4IONS-SCNC: 8 MMOL/L (ref 6–16)
ANION GAP SERPL CALCULATED.4IONS-SCNC: 8 MMOL/L (ref 6–16)
ANION GAP SERPL CALCULATED.4IONS-SCNC: 9 MMOL/L (ref 6–16)
ANION GAP SERPL CALCULATED.4IONS-SCNC: 9 MMOL/L (ref 6–16)
AST SERPL W P-5'-P-CCNC: 13 U/L (ref 12–37)
AST SERPL W P-5'-P-CCNC: 14 U/L (ref 12–37)
AST SERPL W P-5'-P-CCNC: 16 U/L (ref 12–37)
AST SERPL W P-5'-P-CCNC: 17 U/L (ref 12–37)
BASOPHILS # BLD AUTO: 0.07 K/MM3 (ref 0–0.23)
BASOPHILS NFR BLD AUTO: 1 % (ref 0–2)
BILIRUB SERPL-MCNC: 0.2 MG/DL (ref 0.1–1)
BILIRUB SERPL-MCNC: 0.3 MG/DL (ref 0.1–1)
BILIRUB SERPL-MCNC: 0.3 MG/DL (ref 0.1–1)
BILIRUB SERPL-MCNC: 0.5 MG/DL (ref 0.1–1)
BUN SERPL-MCNC: 15 MG/DL (ref 8–24)
BUN SERPL-MCNC: 16 MG/DL (ref 8–24)
BUN SERPL-MCNC: 18 MG/DL (ref 8–24)
BUN SERPL-MCNC: 18 MG/DL (ref 8–24)
CALCIUM SERPL-MCNC: 8.5 MG/DL (ref 8.5–10.1)
CALCIUM SERPL-MCNC: 8.8 MG/DL (ref 8.5–10.1)
CALCIUM SERPL-MCNC: 9 MG/DL (ref 8.5–10.1)
CALCIUM SERPL-MCNC: 9 MG/DL (ref 8.5–10.1)
CHLORIDE SERPL-SCNC: 91 MMOL/L (ref 98–108)
CHLORIDE SERPL-SCNC: 93 MMOL/L (ref 98–108)
CO2 SERPL-SCNC: 25 MMOL/L (ref 21–32)
CO2 SERPL-SCNC: 26 MMOL/L (ref 21–32)
CO2 SERPL-SCNC: 27 MMOL/L (ref 21–32)
CO2 SERPL-SCNC: 28 MMOL/L (ref 21–32)
CREAT SERPL-MCNC: 0.7 MG/DL (ref 0.4–1)
CREAT SERPL-MCNC: 0.78 MG/DL (ref 0.4–1)
CREAT SERPL-MCNC: 0.79 MG/DL (ref 0.4–1)
CREAT SERPL-MCNC: 0.92 MG/DL (ref 0.4–1)
DEPRECATED RDW RBC AUTO: 38.3 FL (ref 35.1–46.3)
EOSINOPHIL # BLD AUTO: 0.17 K/MM3 (ref 0–0.68)
EOSINOPHIL NFR BLD AUTO: 2 % (ref 0–6)
ERYTHROCYTE [DISTWIDTH] IN BLOOD BY AUTOMATED COUNT: 12.5 % (ref 11.7–14.2)
GLOBULIN SER CALC-MCNC: 2.6 G/DL (ref 2.2–4)
GLOBULIN SER CALC-MCNC: 3 G/DL (ref 2.2–4)
GLOBULIN SER CALC-MCNC: 3 G/DL (ref 2.2–4)
GLOBULIN SER CALC-MCNC: 3.1 G/DL (ref 2.2–4)
GLUCOSE SERPL-MCNC: 116 MG/DL (ref 70–99)
GLUCOSE SERPL-MCNC: 152 MG/DL (ref 70–99)
GLUCOSE SERPL-MCNC: 158 MG/DL (ref 70–99)
GLUCOSE SERPL-MCNC: 165 MG/DL (ref 70–99)
HCT VFR BLD AUTO: 33.1 % (ref 33–51)
HGB BLD-MCNC: 11.8 G/DL (ref 11.5–16)
IMM GRANULOCYTES # BLD AUTO: 0.03 K/MM3 (ref 0–0.1)
IMM GRANULOCYTES NFR BLD AUTO: 0 % (ref 0–1)
LYMPHOCYTES # BLD AUTO: 3 K/MM3 (ref 0.84–5.2)
LYMPHOCYTES NFR BLD AUTO: 35 % (ref 21–46)
MCHC RBC AUTO-ENTMCNC: 35.6 G/DL (ref 31.5–36.5)
MCV RBC AUTO: 84 FL (ref 80–100)
MONOCYTES # BLD AUTO: 0.74 K/MM3 (ref 0.16–1.47)
MONOCYTES NFR BLD AUTO: 9 % (ref 4–13)
NEUTROPHILS # BLD AUTO: 4.69 K/MM3 (ref 1.96–9.15)
NEUTROPHILS NFR BLD AUTO: 54 % (ref 41–73)
NRBC # BLD AUTO: 0 K/MM3 (ref 0–0.02)
NRBC BLD AUTO-RTO: 0 /100 WBC (ref 0–0.2)
PLATELET # BLD AUTO: 284 K/MM3 (ref 150–400)
POTASSIUM SERPL-SCNC: 3.6 MMOL/L (ref 3.5–5.5)
POTASSIUM SERPL-SCNC: 4 MMOL/L (ref 3.5–5.5)
POTASSIUM SERPL-SCNC: 4 MMOL/L (ref 3.5–5.5)
POTASSIUM SERPL-SCNC: 4.1 MMOL/L (ref 3.5–5.5)
PROT SERPL-MCNC: 6 G/DL (ref 6.4–8.2)
PROT SERPL-MCNC: 6.6 G/DL (ref 6.4–8.2)
PROT SERPL-MCNC: 6.8 G/DL (ref 6.4–8.2)
PROT SERPL-MCNC: 7 G/DL (ref 6.4–8.2)
SODIUM SERPL-SCNC: 126 MMOL/L (ref 136–145)
SODIUM SERPL-SCNC: 126 MMOL/L (ref 136–145)
SODIUM SERPL-SCNC: 127 MMOL/L (ref 136–145)
SODIUM SERPL-SCNC: 127 MMOL/L (ref 136–145)

## 2022-05-28 NOTE — NUR
SHIFT SUMMARY
 
LYING IN SEMI FOWLERS WITH EYES CLOSED.  AAO X3, TOMAS, FOLLOWS ALL COMMANDS.
HAS RESTED WELL SINCE ADMISSION AT 0030HRS.  ON CONTACT PREAUTION D/T HX OF
ESBL IN URINE FROM 5/8/22.  USING BSC FOR URINATION, HAS BEEN TOLERATING WELL.
 NS AT 100ML/HR INFUSING TO RIGHT AC 20G PIV WITH EASE.  NO REDNESS SWELLING
OR PAIN NOTED.  TELE SHOWS SB 59 PER TT.  DENIES PAIN, DISCOMFORT, OR OTHER
NEEDS AT THIS TIME.  WILL GIVE HAND OFF TO ONCOMING SHIFT USING SBAR AND
CONTINUE TO MONITOR AND ADRESS ALL NEEDS AS THEY ARISE.

## 2022-05-28 NOTE — NUR
END OF SHIFT:
    PATIENT HAS BEEN ALERTA ND ORIENTED, SHOWING NO SIGNS OF AMS, HOWEVER,
WITH THIS IN MIND SODIUM HAS WENT FROM 127 , ANOTHER DRAW TONIGHT AROUND
2200, WILL INFORM NIGHT RN TO CONTINUE TO OBSERVE, PATIENT DENIES DYURIA,
OLIGURIA, OR FREQUENCY. PATIENT DENIES CEHST PAIN OR PRESSURE, FAMILY AT
BEDSIDE ABLE TO TRANSLATE NEEDS, AND INTERPRETOR PHONE AT BEDSIDE, WAS ABLE TO
UTILIZE THE INTEPRETER PHONE THROUGH THE DAY. PATIENT HAS BEEN IN THE CHAIR
SINCE THIS AM. NO SIGNS OF FLUID OVERLOAD AT THIS TIME, AS INFUSING 100mL PER
HOUR THIS IS BAG NUMBER TWO, WITH ADDITIONAL ORAL INTAKE. PATIENT HAS BEEN
VERY COOPERATIVE WITH CARE, ASKS QUESTIONS APPROPRIATELY ESPECIALLY WITH
FAMILY AT THE BEDSIDE. WILL CONTINUE TO MONITOR, THIS WRITER HAS NO CONCERNS,
NEITHER DOES THE FAMILY OR PATIENT.

## 2022-05-28 NOTE — NUR
RECEIVED HAND OFF FROM CHAN WILSON RN USING SBAR.  TRASNSPORTED TO ROOM
VIA STRETCHER.  TRANSFERED SELF TO BED WITH STANDBY ASSIST, TOLERATED WELL.
SON DARIUS ARRIVED TO ROOM WITH HER.  HE INFORMED NURSING THAT SHE IS SPAMISH
SPEAKING ONLY AND ANSWERED ALL QUESTIONS FOR PT PER HER PERMISSION.  AAO X3,
TOMAS, FOLLOWS ALL COMMANDS.  PT IS UNABLE TO SPEAK ENGLISH, TRANSLATER PHONE AT
BEDSIDE FOR STAFF AND PT USE.  ORIENTED TO ROOM, CALL SYSTEM, AND POC, VOICES
UNDERSTANDING.  RESPIRATIONS EVEN AND UNLABOREDON ROOM AIR.  LUNG SOUNDS CLEAR
BILATERALLY.  HYPERACTIVE BOWEL SOUNDS NOTED IN ALL QUADS.  STATES LAST BM WAS
2 DAYS AGO AND WERE NORMAL FOR HER.  CONTINENT OF BOWEL AND BLADDER, USES BSC.
CONTACT [PRECAUSTIONS MAINTAINED DUE TO URINE SAMPLE SHOWING (+) FOR ESBL ON
5/8/22.  SCD'S TO BLE.  TELE SHOWS NSR 80'S PER KRISTIN HOPPER.  ADMISSION
ASSESSMENT IN PROGRESS.  SAFETY MEASURES IN PLACE.  WILL CONTINUE TO MONITOR
AND ADDRESS NEEDS AS THEY ARISE.

## 2022-05-29 LAB
ALBUMIN SERPL BCP-MCNC: 3.8 G/DL (ref 3.4–5)
ANION GAP SERPL CALCULATED.4IONS-SCNC: 7 MMOL/L (ref 6–16)
BUN SERPL-MCNC: 18 MG/DL (ref 8–24)
CALCIUM SERPL-MCNC: 9.3 MG/DL (ref 8.5–10.1)
CHLORIDE SERPL-SCNC: 98 MMOL/L (ref 98–108)
CO2 SERPL-SCNC: 27 MMOL/L (ref 21–32)
CREAT SERPL-MCNC: 0.86 MG/DL (ref 0.4–1)
GLUCOSE SERPL-MCNC: 118 MG/DL (ref 70–99)
OSMOLALITY SERPL: 279 MOS/KG (ref 275–300)
PHOSPHATE SERPL-MCNC: 3.1 MG/DL (ref 2.5–4.9)
POTASSIUM SERPL-SCNC: 4.1 MMOL/L (ref 3.5–5.5)
SODIUM SERPL-SCNC: 132 MMOL/L (ref 136–145)
TSH SERPL DL<=0.005 MIU/L-ACNC: 1.85 UIU/ML (ref 0.36–4.8)
URATE SERPL-MCNC: 3.7 MG/DL (ref 2.6–6)

## 2022-05-29 NOTE — NUR
SHIFT SUMMARY
 
PATIENT IS ALERT AND ORIENTED. PATIENT IS INDEP IN ROOM. PATIENT IS A RECENT
PCU TRANSFER TO ROOM. PATIENT IS ORIENTED TO ROOM AND CALL LIGHT. PATIENT IS A
Malay SPEAKER AND HAD THE  PHONE FOR ANY NEEDS. PATIENT IS ON A 1L
FLUID RESTRICTION. PATIENT HAS HAD NO ACUTE EVENTS THIS SHIFT. VITAL SIGNS
REVIEWED. BED IN LOCKED AND LOWEST POSITION. CALL LIGHT IN PLACE. WILL MONITOR
UNTIL SHIFT CHANGE.

## 2022-05-29 NOTE — NUR
SHIFT SUMMARY
 
LYING ON HER LEFT SIDE FACING THE DOOR WITH EYES CLOSED.  AAO X4, TOMAS, FOLLOWS
ALL COMMANDS.  REMAINS ON CONTACT PREAUTION D/T HX OF ESBL IN URINE FROM
5/8/22.  USING BSC FOR URINATION, HAS BEEN TOLERATING WELL.  NS AT 100ML/HR
INFUSING TO RIGHT AC 20G PIV WITH EASE.  NO REDNESS SWELLING OR PAIN NOTED.
TELE SHOWS SR 76 PER TT.  DENIES PAIN, DISCOMFORT, OR OTHER NEEDS AT THIS
TIME.  WILL GIVE HAND OFF TO ONCOMING SHIFT USING SBAR AND
CONTINUE TO MONITOR AND ADRESS ALL NEEDS AS THEY ARISE.

## 2022-05-29 NOTE — NUR
TRANSFER SUMMARY:
    PATIENT HAS BEEN TRANSFERED BY WHEELCHAIR AND PATIENT CARE [facility] TO MEDICAL
FLOOR. PRIOR TO TRANSFER, PATIENT AND I SPOKE WITH  HELP ABOUT
MOVEMENT, TREATMENT PLAN, AND NEPHROLOGIST. PATIENT HAD COMPLETE UNDERSTANDING
AS WELL AS SON AS I CALLED HIM AFTER GIVING REPORT TO RECIEVING RN. RECIEVING
RN, HAD NO QUESTIONS COMMENTS OR CONCERNS, NEITHER DID THE PATIENT. PATIENT
WAS INFUSING NS , SPOKE WITH RN AS THIS SHOULD HAVE BEEN 4TH BAG.
PATIENT HAS BEEN AFEBRILE NO CHEST PAIN OR PRESSURE, NO SOB. URINE SAMPLE
STILL NEEDED. FLUID RESTRICTION OF 1L. RECIEVING RN AWARE. NO CONCERNS FROM
THIS WRITER AT THIS TIME.

## 2022-05-29 NOTE — NUR
ASSUMPTION OF CARE:
   PATIENT RESTING MORNING , NS RUNNING /Hr. DENIES CHEST
PAIN/PRESSURE, OR SOB. PATIENT STILL IN CONTACT PROCAUTIONS, SODIUM LAST
EVENING 126 WHICH IS PREVIOUS DRAW VALUE AS WELL. PATIENT SHOWING NO SIGNS OF
FLUID OVERLOAD AT THIS TIME. WILL CONTINUE TO MONITOR.

## 2022-05-30 LAB
ALBUMIN SERPL BCP-MCNC: 3.4 G/DL (ref 3.4–5)
ANION GAP SERPL CALCULATED.4IONS-SCNC: 7 MMOL/L (ref 6–16)
BUN SERPL-MCNC: 22 MG/DL (ref 8–24)
CALCIUM SERPL-MCNC: 8.9 MG/DL (ref 8.5–10.1)
CHLORIDE SERPL-SCNC: 105 MMOL/L (ref 98–108)
CO2 SERPL-SCNC: 26 MMOL/L (ref 21–32)
CREAT SERPL-MCNC: 1.04 MG/DL (ref 0.4–1)
GLUCOSE SERPL-MCNC: 154 MG/DL (ref 70–99)
PHOSPHATE SERPL-MCNC: 3.6 MG/DL (ref 2.5–4.9)
POTASSIUM SERPL-SCNC: 4.2 MMOL/L (ref 3.5–5.5)
SODIUM SERPL-SCNC: 138 MMOL/L (ref 136–145)

## 2022-05-30 NOTE — NUR
PT IN ISOLATION FOR ESBL IN URINE, IND TO BSC, PLEASANT Norwegian SPEAKING. CBG
AC/HS. DENIES PAIN, U/A OBTAINED THIS SHIFT.

## 2022-05-30 NOTE — NUR
PT AWAKE AT START OF SHIFT, SITTING UP TO EOB. INDEPENDENT TO BSC. IVF'S
INFUSING PER EMAR. THERAPY HERE TO SEE AND ASSESS PT. UA ORDERED BY DR TAPIA
AND LATER OBTAINED AND SENT. RENAL US DONE IN  AS WELL. PT'S SON TO 
SHORTLY AFTER BREAKFAST. DR MOON HERE TO SEE PT THIS AFTERNOON AND DISCUSSED
PLAN OF CARE WITH PT AND SON. QUESTIONS ANSWERED AND THEN D/C ORDERS PLACED.
MEDS FAXED TO [facility], PER SON REQUEST. IV SITE D/C'D WNL. D/C
ORDERS REVIEWED WITH PT AND SON. PT ABLE TO DRESS HERSELF AND THEN TAKEN DOWN
TO SON'S CAR VIA W/C. DENIED FURTHER NEEDS.